# Patient Record
Sex: FEMALE | Race: WHITE | NOT HISPANIC OR LATINO | URBAN - METROPOLITAN AREA
[De-identification: names, ages, dates, MRNs, and addresses within clinical notes are randomized per-mention and may not be internally consistent; named-entity substitution may affect disease eponyms.]

---

## 2018-01-25 VITALS
OXYGEN SATURATION: 100 % | DIASTOLIC BLOOD PRESSURE: 78 MMHG | HEIGHT: 62 IN | WEIGHT: 166.89 LBS | SYSTOLIC BLOOD PRESSURE: 141 MMHG | HEART RATE: 80 BPM | TEMPERATURE: 98 F

## 2018-01-25 RX ORDER — CHLORHEXIDINE GLUCONATE 213 G/1000ML
1 SOLUTION TOPICAL ONCE
Qty: 0 | Refills: 0 | Status: CANCELLED | OUTPATIENT
Start: 2018-01-29 | End: 2018-01-27

## 2018-01-25 NOTE — H&P ADULT - HISTORY OF PRESENT ILLNESS
67 y.o Female with PMHx of  HTN, COPD (denies home O2), Anemia,  who presented to her cardiologist c/o exertional CP and SOB over the past few months.  Pt states she has been experiencing  left sided  CP with accompanying CARCAMO upon ambulation of more than two city blocks     Denies CP,  SOB, palpitations, dizziness, syncope, recent PND/orthopnea, LE edema, or decrease in exercise tolerance.  Echo performed on 01/24/18 revealed normal wall motion, trace to mild MR, LVEF of 60%.  Stress Echo done 01/25/48 revealed inferior, inferior septal and apical wall hypokinesis.     In light of pt's risk factors, Known CAD, above CCS Anginal Class 3 Symptoms, and an abnormal Stress Echo, pt is now referred to [facility] for recommended Cardiac Cath with possible  intervention if clinically indicated to r/o suspected underlying CAD. ***NEEDS TO BE PRE-MEDICATED UPON ARRIVAL     67 y.o Female formers smoker with SEV[employer] PMHx of COPD (denies home O2), Anemia, Lung Nodules under surveillance Q 6months,  who presented to her cardiologist c/o progressively worsening CARCAMO with increasing fatigue over the past few months.  Pt states she can barely walk more than one city block before finding the need to stop and "catch" her breath.  She states for some time she has attributed    these symptoms to her underlying COPD till she recently saw her cardiologist.  She denies experiencing any CP,  palpitations, dizziness, syncope, recent PND/orthopnea, or LE edema.  Echo performed on 01/24/18 revealed normal wall motion, trace to mild MR, LVEF of 60%.  Stress Echo done 01/25/48 revealed inferior, inferior septal and apical wall hypokinesis.     In light of pt's risk factors, Known CAD, above CCS Anginal Class 3 Equivalent Symptoms, and an abnormal Stress Echo, pt is now referred to [facility] for recommended Cardiac Cath with possible  intervention if clinically indicated to r/o suspected underlying CAD. ***NEEDS TO BE PRE-MEDICATED UPON ARRIVAL     67 y.o Female formers smoker with SEVERE [employer]LL[employer] PMHx of COPD (denies home O2), Anemia, Lung Nodules under surveillance Q 6months,  who presented to her cardiologist c/o progressively worsening CARCAMO with increasing fatigue over the past few months.  Pt states she can barely walk more than one city block before finding the need to stop and "catch her breath".  She states for some time she has attributed these symptoms to her underlying COPD till she recently saw her cardiologist who recommended ruling out a cardiac etiology. She denies experiencing any CP,  palpitations, dizziness, syncope, recent PND/orthopnea, or LE edema.  Echo performed on 01/24/18 revealed normal wall motion, trace to mild MR, LVEF of 60%.  Stress Echo done 01/25/48 revealed inferior, inferior septal and apical wall hypokinesis.     In light of pt's risk factors, Known CAD, above CCS Anginal Class 3 Equivalent Symptoms, and an abnormal Stress Echo, pt is now referred to [facility] for recommended Cardiac Cath with possible  intervention if clinically indicated to r/o suspected underlying CAD. 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  Pt states she can barely walk more than one city block before finding the need to stop and "catch her breath".  She states for some time she has attributed these symptoms to her underlying COPD till she recently saw her cardiologist who recommended ruling out a cardiac etiology. She denies experiencing any CP,  recent PND/orthopnea, LE edema, palpitations, dizziness, or syncope.  Echo performed on 01/24/18 revealed normal wall motion, trace to mild MR, LVEF of 60%.  Stress Echo done 01/25/48 revealed inferior, inferior septal and apical wall hypokinesis.     In light of pt's risk factors, Known CAD, above CCS Anginal Class 3 Equivalent Symptoms, and an abnormal Stress Echo, pt is now referred to [facility] for recommended Cardiac Cath with possible  intervention if clinically indicated to r/o suspected underlying CAD. 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  Pt states she can barely walk more than one city block before finding the need to stop and "catch her breath".  She states for some time she has attributed these symptoms to her underlying COPD till she recently saw her cardiologist who recommended ruling out a cardiac etiology. She denies experiencing any CP,  recent PND/orthopnea, LE edema, palpitations, dizziness, or syncope.  Echo performed on 01/24/18 revealed normal wall motion, trace to mild MR, LVEF of 60%.  Stress Echo done 01/25/18 revealed inferior, inferior septal and apical wall hypokinesis.     In light of pt's risk factors, Known CAD, above CCS Anginal Class 3 Equivalent Symptoms, and an abnormal Stress Echo, pt is now referred to [facility] for recommended Cardiac Cath with possible  intervention if clinically indicated to r/o suspected underlying CAD. 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  Pt states she can barely walk more than one city block before finding the need to stop and "catch her breath".  She states for some time she has attributed these symptoms to her underlying COPD till she recently saw her cardiologist who recommended ruling out a cardiac etiology. She denies experiencing any CP,  recent PND/orthopnea, LE edema, palpitations, dizziness, or syncope.  Echo performed on 01/24/18 revealed normal wall motion, trace to mild MR, LVEF of 60%.  Stress Echo done 01/25/18 revealed inferior, inferior septal and apical wall hypokinesis.     In light of pt's risk factors, Known CAD, above CCS Anginal Class 3 Equivalent Symptoms, and an abnormal Stress Echo, pt is now referred to [facility] for recommended Cardiac Cath with possible  intervention if clinically indicated to r/o suspected underlying CAD.

## 2018-01-25 NOTE — H&P ADULT - ASSESSMENT
67 y.o Female former smoker with SEVERE SHELLF[employer] ALLERGY PMHx of COPD (denies home O2), Anemia, Lung Nodules under surveillance Q 6months,  who presents for recommended Cardiac Cath with possible intervention if clinically indicated secondarty to CCS Anginal Class 3 Equivalent symptoms in the setting of an abnormal Stress Echo.          Precath/Consented   Severe Shellfish Allergy-premedicated with Solu-cortef 200mg IVP X 1 STAT  and Benadryl 50mg IVP X 1 on call to the cath lab  Recently started on ASA/Brilinta 60mg PO BID, reloaded upon arrival with Brilinta 180mg PO X  1  IVF hydration NS @ 75cc/hr 67 y.o Female former smoker with SEVERE SHELLF[employer]ERGY PMHx of COPD (denies home O2), Anemia, Lung Nodules under surveillance Q 6months,  who presents for recommended Cardiac Cath with possible intervention if clinically indicated secondarty to CCS Anginal Class 3 Equivalent symptoms in the setting of an abnormal Stress Echo.          -Precath/Consented   -Severe Shellfish Allergy-premedicated with Solu-cortef 200mg IVP X 1 STAT  and Benadryl 50mg IVP X 1 on call to the cath lab  -Recently started on ASA/Brilinta 60mg PO BID, reloaded upon arrival with Brilinta 180mg PO X  1 and ASA 325mg PO x 1   -IVF hydration NS @ 75cc/hr   -WBC elevated 13.4, most likely 2/2 daily  Symbicort use

## 2018-01-25 NOTE — H&P ADULT - FAMILY HISTORY
Father  Still living? No  CVA (cerebral vascular accident), Age at diagnosis: Age Unknown     Sibling  Still living? Unknown  Family history of coronary artery disease in brother, Age at diagnosis: Age Unknown

## 2018-01-25 NOTE — H&P ADULT - PMH
Anemia    COPD (chronic obstructive pulmonary disease) Anemia    COPD (chronic obstructive pulmonary disease)    Lung nodules Anemia    COPD (chronic obstructive pulmonary disease)    H/O: pneumothorax    Lung nodules

## 2018-01-26 ENCOUNTER — INPATIENT (INPATIENT)
Facility: HOSPITAL | Age: 68
LOS: 0 days | Discharge: HOME | DRG: 247 | End: 2018-01-27
Attending: INTERNAL MEDICINE | Admitting: INTERNAL MEDICINE
Payer: COMMERCIAL

## 2018-01-26 DIAGNOSIS — Z90.11 ACQUIRED ABSENCE OF RIGHT BREAST AND NIPPLE: Chronic | ICD-10-CM

## 2018-01-26 LAB
ALBUMIN SERPL ELPH-MCNC: 3.9 G/DL — SIGNIFICANT CHANGE UP (ref 3.3–5)
ALP SERPL-CCNC: 69 U/L — SIGNIFICANT CHANGE UP (ref 40–120)
ALT FLD-CCNC: 22 U/L — SIGNIFICANT CHANGE UP (ref 10–45)
ANION GAP SERPL CALC-SCNC: 13 MMOL/L — SIGNIFICANT CHANGE UP (ref 5–17)
APTT BLD: 29 SEC — SIGNIFICANT CHANGE UP (ref 27.5–37.4)
AST SERPL-CCNC: 19 U/L — SIGNIFICANT CHANGE UP (ref 10–40)
BASOPHILS NFR BLD AUTO: 0.3 % — SIGNIFICANT CHANGE UP (ref 0–2)
BILIRUB SERPL-MCNC: 0.4 MG/DL — SIGNIFICANT CHANGE UP (ref 0.2–1.2)
BUN SERPL-MCNC: 11 MG/DL — SIGNIFICANT CHANGE UP (ref 7–23)
CALCIUM SERPL-MCNC: 9.5 MG/DL — SIGNIFICANT CHANGE UP (ref 8.4–10.5)
CHLORIDE SERPL-SCNC: 100 MMOL/L — SIGNIFICANT CHANGE UP (ref 96–108)
CHOLEST SERPL-MCNC: 185 MG/DL — SIGNIFICANT CHANGE UP (ref 10–199)
CK MB CFR SERPL CALC: 3.1 NG/ML — SIGNIFICANT CHANGE UP (ref 0–6.7)
CO2 SERPL-SCNC: 24 MMOL/L — SIGNIFICANT CHANGE UP (ref 22–31)
CREAT SERPL-MCNC: 0.58 MG/DL — SIGNIFICANT CHANGE UP (ref 0.5–1.3)
CRP SERPL-MCNC: 0.99 MG/DL — HIGH (ref 0–0.4)
EOSINOPHIL NFR BLD AUTO: 1.6 % — SIGNIFICANT CHANGE UP (ref 0–6)
GLUCOSE SERPL-MCNC: 128 MG/DL — HIGH (ref 70–99)
HBA1C BLD-MCNC: 5.4 % — SIGNIFICANT CHANGE UP (ref 4–5.6)
HCT VFR BLD CALC: 36.7 % — SIGNIFICANT CHANGE UP (ref 34.5–45)
HDLC SERPL-MCNC: 55 MG/DL — SIGNIFICANT CHANGE UP (ref 40–125)
HGB BLD-MCNC: 11.8 G/DL — SIGNIFICANT CHANGE UP (ref 11.5–15.5)
INR BLD: 1.04 — SIGNIFICANT CHANGE UP (ref 0.88–1.16)
LIPID PNL WITH DIRECT LDL SERPL: 113 MG/DL — SIGNIFICANT CHANGE UP
LYMPHOCYTES # BLD AUTO: 17.9 % — SIGNIFICANT CHANGE UP (ref 13–44)
MCHC RBC-ENTMCNC: 28.6 PG — SIGNIFICANT CHANGE UP (ref 27–34)
MCHC RBC-ENTMCNC: 32.2 G/DL — SIGNIFICANT CHANGE UP (ref 32–36)
MCV RBC AUTO: 89.1 FL — SIGNIFICANT CHANGE UP (ref 80–100)
MONOCYTES NFR BLD AUTO: 10.3 % — SIGNIFICANT CHANGE UP (ref 2–14)
NEUTROPHILS NFR BLD AUTO: 69.9 % — SIGNIFICANT CHANGE UP (ref 43–77)
PLATELET # BLD AUTO: 286 K/UL — SIGNIFICANT CHANGE UP (ref 150–400)
POTASSIUM SERPL-MCNC: 3.9 MMOL/L — SIGNIFICANT CHANGE UP (ref 3.5–5.3)
POTASSIUM SERPL-SCNC: 3.9 MMOL/L — SIGNIFICANT CHANGE UP (ref 3.5–5.3)
PROT SERPL-MCNC: 7.7 G/DL — SIGNIFICANT CHANGE UP (ref 6–8.3)
PROTHROM AB SERPL-ACNC: 11.5 SEC — SIGNIFICANT CHANGE UP (ref 9.8–12.7)
RBC # BLD: 4.12 M/UL — SIGNIFICANT CHANGE UP (ref 3.8–5.2)
RBC # FLD: 14 % — SIGNIFICANT CHANGE UP (ref 10.3–16.9)
SODIUM SERPL-SCNC: 137 MMOL/L — SIGNIFICANT CHANGE UP (ref 135–145)
TOTAL CHOLESTEROL/HDL RATIO MEASUREMENT: 3.4 RATIO — SIGNIFICANT CHANGE UP (ref 3.3–7.1)
TRIGL SERPL-MCNC: 83 MG/DL — SIGNIFICANT CHANGE UP (ref 10–149)
WBC # BLD: 13.4 K/UL — HIGH (ref 3.8–10.5)
WBC # FLD AUTO: 13.4 K/UL — HIGH (ref 3.8–10.5)

## 2018-01-26 PROCEDURE — 93458 L HRT ARTERY/VENTRICLE ANGIO: CPT | Mod: 26,XU

## 2018-01-26 PROCEDURE — 93572 IV DOP VEL&/PRESS C FLO EA: CPT | Mod: 26,LC

## 2018-01-26 PROCEDURE — 93571 IV DOP VEL&/PRESS C FLO 1ST: CPT | Mod: 26,LD

## 2018-01-26 PROCEDURE — 92928 PRQ TCAT PLMT NTRAC ST 1 LES: CPT | Mod: RC

## 2018-01-26 PROCEDURE — 93010 ELECTROCARDIOGRAM REPORT: CPT

## 2018-01-26 RX ORDER — ASPIRIN/CALCIUM CARB/MAGNESIUM 324 MG
81 TABLET ORAL DAILY
Qty: 0 | Refills: 0 | Status: DISCONTINUED | OUTPATIENT
Start: 2018-01-26 | End: 2018-01-27

## 2018-01-26 RX ORDER — TICAGRELOR 90 MG/1
0 TABLET ORAL
Qty: 0 | Refills: 0 | COMMUNITY

## 2018-01-26 RX ORDER — ASPIRIN/CALCIUM CARB/MAGNESIUM 324 MG
325 TABLET ORAL ONCE
Qty: 0 | Refills: 0 | Status: COMPLETED | OUTPATIENT
Start: 2018-01-26 | End: 2018-01-26

## 2018-01-26 RX ORDER — METOPROLOL TARTRATE 50 MG
100 TABLET ORAL DAILY
Qty: 0 | Refills: 0 | Status: DISCONTINUED | OUTPATIENT
Start: 2018-01-26 | End: 2018-01-27

## 2018-01-26 RX ORDER — SODIUM CHLORIDE 9 MG/ML
500 INJECTION INTRAMUSCULAR; INTRAVENOUS; SUBCUTANEOUS
Qty: 0 | Refills: 0 | Status: DISCONTINUED | OUTPATIENT
Start: 2018-01-26 | End: 2018-01-26

## 2018-01-26 RX ORDER — ATORVASTATIN CALCIUM 80 MG/1
20 TABLET, FILM COATED ORAL DAILY
Qty: 0 | Refills: 0 | Status: DISCONTINUED | OUTPATIENT
Start: 2018-01-26 | End: 2018-01-26

## 2018-01-26 RX ORDER — TICAGRELOR 90 MG/1
180 TABLET ORAL ONCE
Qty: 0 | Refills: 0 | Status: COMPLETED | OUTPATIENT
Start: 2018-01-26 | End: 2018-01-26

## 2018-01-26 RX ORDER — HYDROCORTISONE 20 MG
200 TABLET ORAL ONCE
Qty: 0 | Refills: 0 | Status: COMPLETED | OUTPATIENT
Start: 2018-01-26 | End: 2018-01-26

## 2018-01-26 RX ORDER — SODIUM CHLORIDE 9 MG/ML
500 INJECTION INTRAMUSCULAR; INTRAVENOUS; SUBCUTANEOUS
Qty: 0 | Refills: 0 | Status: DISCONTINUED | OUTPATIENT
Start: 2018-01-26 | End: 2018-01-27

## 2018-01-26 RX ORDER — DIPHENHYDRAMINE HCL 50 MG
50 CAPSULE ORAL ONCE
Qty: 0 | Refills: 0 | Status: COMPLETED | OUTPATIENT
Start: 2018-01-26 | End: 2018-01-26

## 2018-01-26 RX ORDER — TICAGRELOR 90 MG/1
90 TABLET ORAL EVERY 12 HOURS
Qty: 0 | Refills: 0 | Status: DISCONTINUED | OUTPATIENT
Start: 2018-01-26 | End: 2018-01-27

## 2018-01-26 RX ORDER — ATORVASTATIN CALCIUM 80 MG/1
40 TABLET, FILM COATED ORAL AT BEDTIME
Qty: 0 | Refills: 0 | Status: DISCONTINUED | OUTPATIENT
Start: 2018-01-26 | End: 2018-01-27

## 2018-01-26 RX ORDER — BUDESONIDE AND FORMOTEROL FUMARATE DIHYDRATE 160; 4.5 UG/1; UG/1
2 AEROSOL RESPIRATORY (INHALATION)
Qty: 0 | Refills: 0 | Status: DISCONTINUED | OUTPATIENT
Start: 2018-01-26 | End: 2018-01-27

## 2018-01-26 RX ORDER — TIOTROPIUM BROMIDE 18 UG/1
1 CAPSULE ORAL; RESPIRATORY (INHALATION) DAILY
Qty: 0 | Refills: 0 | Status: DISCONTINUED | OUTPATIENT
Start: 2018-01-26 | End: 2018-01-27

## 2018-01-26 RX ORDER — ACETAMINOPHEN 500 MG
325 TABLET ORAL ONCE
Qty: 0 | Refills: 0 | Status: COMPLETED | OUTPATIENT
Start: 2018-01-26 | End: 2018-01-26

## 2018-01-26 RX ORDER — RANOLAZINE 500 MG/1
500 TABLET, FILM COATED, EXTENDED RELEASE ORAL EVERY 12 HOURS
Qty: 0 | Refills: 0 | Status: DISCONTINUED | OUTPATIENT
Start: 2018-01-26 | End: 2018-01-27

## 2018-01-26 RX ADMIN — Medication 50 MILLIGRAM(S): at 09:43

## 2018-01-26 RX ADMIN — Medication 325 MILLIGRAM(S): at 09:41

## 2018-01-26 RX ADMIN — Medication 325 MILLIGRAM(S): at 14:08

## 2018-01-26 RX ADMIN — SODIUM CHLORIDE 75 MILLILITER(S): 9 INJECTION INTRAMUSCULAR; INTRAVENOUS; SUBCUTANEOUS at 09:40

## 2018-01-26 RX ADMIN — BUDESONIDE AND FORMOTEROL FUMARATE DIHYDRATE 2 PUFF(S): 160; 4.5 AEROSOL RESPIRATORY (INHALATION) at 21:17

## 2018-01-26 RX ADMIN — TICAGRELOR 180 MILLIGRAM(S): 90 TABLET ORAL at 09:40

## 2018-01-26 RX ADMIN — ATORVASTATIN CALCIUM 40 MILLIGRAM(S): 80 TABLET, FILM COATED ORAL at 21:17

## 2018-01-26 RX ADMIN — Medication 200 MILLIGRAM(S): at 09:44

## 2018-01-26 RX ADMIN — SODIUM CHLORIDE 75 MILLILITER(S): 9 INJECTION INTRAMUSCULAR; INTRAVENOUS; SUBCUTANEOUS at 14:50

## 2018-01-26 RX ADMIN — TICAGRELOR 90 MILLIGRAM(S): 90 TABLET ORAL at 21:17

## 2018-01-26 RX ADMIN — RANOLAZINE 500 MILLIGRAM(S): 500 TABLET, FILM COATED, EXTENDED RELEASE ORAL at 17:32

## 2018-01-26 NOTE — CONSULT NOTE ADULT - SUBJECTIVE AND OBJECTIVE BOX
CHIEF COMPLAINT: currently denies complaint    HISTORY OF PRESENT ILLNESS:  Mrs. Smith is a 67 year-old female, former smoker,  PMHx COPD, Anemia, Lung Nodules under surveillance Q 6months, HCV s/p Harvoni treatment 2012 who presented to her cardiologist c/o progressively worsening CARCAMO with increasing fatigue over the past few months. Referred to cardiology, TTE 01/24/18 revealed normal wall motion, trace to mild MR, LVEF of 60%.  Stress Echo 01/25/18 revealed inferior septal and apical wall hypokinesis. Now s/p cardiac cath 1/26/18 with FARHANA x 1 RPL, LCX FFR 0.94, mLAD FFR 0.92. Now consulted for prevention.     PMD: Dr. César Dominguez    PAST MEDICAL & SURGICAL HISTORY:  pneumothorax  Lung nodules  COPD (chronic obstructive pulmonary disease)  Anemia  History of lumpectomy of right breast: Benign  HCV s/p Harvoni 2012    FAMILY HISTORY:   Family history of coronary artery disease in brother (Sibling)  CVA (cerebral vascular accident) (Father)  No pertinent family history in first degree relatives    ALLERGIES:   eggs (Rash)  shellfish (Rash; Short breath)  sulfa drugs (Short breath; Rash)    HOME MEDICATIONS:  Spiriva  Symbicort  Claritin-D    INPATIENT MEDS:  aspirin enteric coated 81 milliGRAM(s) Oral daily  atorvastatin 40 milliGRAM(s) Oral at bedtime  buDESOnide 160 MICROgram(s)/formoterol 4.5 MICROgram(s) Inhaler 2 Puff(s) Inhalation two times a day  metoprolol succinate  milliGRAM(s) Oral daily  ranolazine 500 milliGRAM(s) Oral every 12 hours  sodium chloride 0.9%. 500 milliLiter(s) IV Continuous <Continuous>  ticagrelor 90 milliGRAM(s) Oral every 12 hours  tiotropium 18 MICROgram(s) Capsule 1 Capsule(s) Inhalation daily  	  PHYSICAL EXAM:  HR: 87 (01-26-18 @ 15:08) (87 - 87)  BP: 112/61 (01-26-18 @ 15:08) (112/61 - 112/61)  RR: 16 (01-26-18 @ 15:08) (16 - 16)  SpO2: 92% (01-26-18 @ 15:08) (92% - 92%)  Height (cm): 157.48 (01-26 @ 09:26)  Weight (kg): 75.7 (01-26 @ 09:26)  BMI (kg/m2): 30.5 (01-26 @ 09:26)  	  LABS:	                   11.8   13.4  )-----------( 286      ( 26 Jan 2018 09:05 )             36.7     01-26    137  |  100  |  11  ----------------------------<  128<H>  3.9   |  24  |  0.58    Ca    9.5      26 Jan 2018 09:05    TPro  7.7  /  Alb  3.9  /  TBili  0.4  /  DBili  x   /  AST  19  /  ALT  22  /  AlkPhos  69  01-26    Hemoglobin A1C, Whole Blood: 5.4 % (01-26 @ 09:05)    Cholesterol, Serum: 185 mg/dL (01-26 @ 09:05)  HDL Cholesterol, Serum: 55 mg/dL (01-26 @ 09:05)  Triglycerides, Serum: 83 mg/dL (01-26 @ 09:05)  Direct LDL: 113 mg/dL (01-26 @ 09:05)    C-Reactive Protein, Serum: 0.99 mg/dL (01-26 @ 09:05)    Hemoglobin A1C, Whole Blood: 5.4 % (01-26-18 @ 09:05)    10 "S" ASSESSMENT PLAN: SMOKING, SITTING, SUGAR, SALT, SOME FATS, SOCIAL, SLEEP, SIGNS, AND MEDS:  Tobacco usage: former ~45 years, quit 2016, 2PPD-->1PPD-->7 cigs/day then stopped after lung nodules found.   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   Hormone Replacement: denies  Sleep Disorder: reports 6-8 hours of sleep, no previous sleep study, denies snoring  Inflammatory Condition: s/p HCV 2012, denies other   Activity Level: active around her home, but denies receiving 30-45 minutes moderate exercise most days.   Heart Failure: denies LE edema, PND, orthopnea, abdominal distention, cough  Myopathy with Statins: no previous statin use.  GI/ Issues: denies N/V/C/D  Current Diet:  Br: rice chex/frosted flakes with 1 sugar packet and 2% milk.  Lunch: english muffin or Italian bread with turkey; sometimes 1 scrambled egg  Dinner: red meat ~3 times/week, chicken ~3 times/week, all roasted or baked, usually has " a starch, a veggie, and a meat", occasional hot dog, cheeseburgers. Cooks with butter and EVOO.   S/D: [place] bars, cookies, rice pudding, Napoleans    ASSESSMENT/RECOMMENDATIONS: 	  Summary:   67 year-old female, former smoker,  PMHx COPD, Anemia, Lung Nodules under surveillance Q 6months, HCV s/p Harvoni treatment 2012 who presented to her cardiologist c/o progressively worsening CARCAMO with increasing fatigue over the past few months now s/p cardiac cath 1/26/18 with FARHANA x 1 RPL, LCX FFR 0.94, mLAD FFR 0.92. Consulted for prevention.     RECOMMENDATIONS:   Anti-platelet Therapy: DAPT per interventionalist recommendation.   Lipid Therapy: Recommend high dose statin, serial LFT's given HCV hx  Beta Blocker Therapy: Continue current therapy per your discretion.   ACE/ARB Therapy: Continue current therapy per your discretion.   Diet: Low-sodium, low-carbohydrate, Mediterranean diet. Written instruction on the Mediterranean diet was provided. Discussed ways to modify her current diet to incorporate more whole grains and lean meats as well as reducing refined sugars which may lead to DM.   Exercise: 30-45 minutes most days of the week once cleared to do so by their referring cardiologist. Recommend cardiac rehabilitation when appropriate.   Smoking: This patient is a non-smoker.   Stress Management: Instruction on mindfulness meditation was provided.   Sleep: Target 6-8 hours per night  Other: Patient is motivated and is setting a goal for 10 pound weight loss by the next few months with lifestyle and diet modifications. Reiterated the importance of exercise to diminish the central abdominal fat that contributes to heart disease.     Thank you for the opportunity to see this patient. Please feel free to contact [facility] if there are any questions, or if you feel that your patient would benefit from continued follow-up visits with the Program.

## 2018-01-27 VITALS — TEMPERATURE: 97 F

## 2018-01-27 LAB
ANION GAP SERPL CALC-SCNC: 12 MMOL/L — SIGNIFICANT CHANGE UP (ref 5–17)
BUN SERPL-MCNC: 12 MG/DL — SIGNIFICANT CHANGE UP (ref 7–23)
CALCIUM SERPL-MCNC: 8.8 MG/DL — SIGNIFICANT CHANGE UP (ref 8.4–10.5)
CHLORIDE SERPL-SCNC: 102 MMOL/L — SIGNIFICANT CHANGE UP (ref 96–108)
CO2 SERPL-SCNC: 25 MMOL/L — SIGNIFICANT CHANGE UP (ref 22–31)
CREAT SERPL-MCNC: 0.68 MG/DL — SIGNIFICANT CHANGE UP (ref 0.5–1.3)
GLUCOSE SERPL-MCNC: 104 MG/DL — HIGH (ref 70–99)
HCT VFR BLD CALC: 35.2 % — SIGNIFICANT CHANGE UP (ref 34.5–45)
HGB BLD-MCNC: 11.3 G/DL — LOW (ref 11.5–15.5)
MAGNESIUM SERPL-MCNC: 2.1 MG/DL — SIGNIFICANT CHANGE UP (ref 1.6–2.6)
MCHC RBC-ENTMCNC: 28.6 PG — SIGNIFICANT CHANGE UP (ref 27–34)
MCHC RBC-ENTMCNC: 32.1 G/DL — SIGNIFICANT CHANGE UP (ref 32–36)
MCV RBC AUTO: 89.1 FL — SIGNIFICANT CHANGE UP (ref 80–100)
PLATELET # BLD AUTO: 285 K/UL — SIGNIFICANT CHANGE UP (ref 150–400)
POTASSIUM SERPL-MCNC: 3.2 MMOL/L — LOW (ref 3.5–5.3)
POTASSIUM SERPL-SCNC: 3.2 MMOL/L — LOW (ref 3.5–5.3)
RBC # BLD: 3.95 M/UL — SIGNIFICANT CHANGE UP (ref 3.8–5.2)
RBC # FLD: 14 % — SIGNIFICANT CHANGE UP (ref 10.3–16.9)
SODIUM SERPL-SCNC: 139 MMOL/L — SIGNIFICANT CHANGE UP (ref 135–145)
WBC # BLD: 13.5 K/UL — HIGH (ref 3.8–10.5)
WBC # FLD AUTO: 13.5 K/UL — HIGH (ref 3.8–10.5)

## 2018-01-27 PROCEDURE — 99239 HOSP IP/OBS DSCHRG MGMT >30: CPT

## 2018-01-27 RX ORDER — RANOLAZINE 500 MG/1
1 TABLET, FILM COATED, EXTENDED RELEASE ORAL
Qty: 0 | Refills: 0 | COMMUNITY

## 2018-01-27 RX ORDER — TICAGRELOR 90 MG/1
1 TABLET ORAL
Qty: 60 | Refills: 11 | OUTPATIENT
Start: 2018-01-27 | End: 2019-01-21

## 2018-01-27 RX ORDER — METOPROLOL TARTRATE 50 MG
1 TABLET ORAL
Qty: 30 | Refills: 2 | OUTPATIENT
Start: 2018-01-27 | End: 2018-04-26

## 2018-01-27 RX ORDER — METOPROLOL TARTRATE 50 MG
1 TABLET ORAL
Qty: 0 | Refills: 0 | COMMUNITY

## 2018-01-27 RX ORDER — ASPIRIN/CALCIUM CARB/MAGNESIUM 324 MG
1 TABLET ORAL
Qty: 30 | Refills: 11 | OUTPATIENT
Start: 2018-01-27 | End: 2019-01-21

## 2018-01-27 RX ORDER — RANOLAZINE 500 MG/1
1 TABLET, FILM COATED, EXTENDED RELEASE ORAL
Qty: 60 | Refills: 2 | OUTPATIENT
Start: 2018-01-27 | End: 2018-04-26

## 2018-01-27 RX ORDER — METOPROLOL TARTRATE 50 MG
4 TABLET ORAL
Qty: 120 | Refills: 2 | OUTPATIENT
Start: 2018-01-27 | End: 2018-04-26

## 2018-01-27 RX ORDER — POTASSIUM CHLORIDE 20 MEQ
40 PACKET (EA) ORAL EVERY 4 HOURS
Qty: 0 | Refills: 0 | Status: COMPLETED | OUTPATIENT
Start: 2018-01-27 | End: 2018-01-27

## 2018-01-27 RX ORDER — ATORVASTATIN CALCIUM 80 MG/1
1 TABLET, FILM COATED ORAL
Qty: 30 | Refills: 2 | OUTPATIENT
Start: 2018-01-27 | End: 2018-04-26

## 2018-01-27 RX ORDER — TICAGRELOR 90 MG/1
1 TABLET ORAL
Qty: 60 | Refills: 0 | OUTPATIENT
Start: 2018-01-27 | End: 2018-02-25

## 2018-01-27 RX ORDER — TICAGRELOR 90 MG/1
1 TABLET ORAL
Qty: 0 | Refills: 0 | COMMUNITY

## 2018-01-27 RX ORDER — ASPIRIN/CALCIUM CARB/MAGNESIUM 324 MG
1 TABLET ORAL
Qty: 0 | Refills: 0 | COMMUNITY

## 2018-01-27 RX ADMIN — Medication 40 MILLIEQUIVALENT(S): at 12:24

## 2018-01-27 RX ADMIN — RANOLAZINE 500 MILLIGRAM(S): 500 TABLET, FILM COATED, EXTENDED RELEASE ORAL at 06:46

## 2018-01-27 RX ADMIN — Medication 81 MILLIGRAM(S): at 12:24

## 2018-01-27 RX ADMIN — Medication 100 MILLIGRAM(S): at 06:46

## 2018-01-27 RX ADMIN — BUDESONIDE AND FORMOTEROL FUMARATE DIHYDRATE 2 PUFF(S): 160; 4.5 AEROSOL RESPIRATORY (INHALATION) at 08:27

## 2018-01-27 RX ADMIN — Medication 40 MILLIEQUIVALENT(S): at 10:13

## 2018-01-27 RX ADMIN — TICAGRELOR 90 MILLIGRAM(S): 90 TABLET ORAL at 10:13

## 2018-01-27 NOTE — DISCHARGE NOTE ADULT - PATIENT PORTAL LINK FT
“You can access the [facility] Patient Portal, offered by [facility], by registering with the following website: [email or web address]”

## 2018-01-27 NOTE — DISCHARGE NOTE ADULT - CARE PLAN
Principal Discharge DX:	Coronary artery disease  Goal:	Maintain Low Fat, Low Cholesterol, and Low Salt Diet  Assessment and plan of treatment:	You had a cardiac angiogram with stent placement to one of your heart arteries (Right Coronary Artery). Continue Aspirin and Brilinta. DO NOT STOP TAKING THESE MEDICATIONS UNLESS INSTRUCTED BY YOUR CARDIOLOGIST AS YOUR STENT CAN CLOSE RESULTING IN HEART ATTACK. You were started on a cholesterol medication (Lipitor). Have Liver Function and Cholesterol levels Checked in 1 month  Secondary Diagnosis:	COPD (chronic obstructive pulmonary disease)  Assessment and plan of treatment:	Continue Inhalers Principal Discharge DX:	Coronary artery disease  Goal:	Maintain Low Fat, Low Cholesterol, and Low Salt Diet  Assessment and plan of treatment:	You had a cardiac angiogram with stent placement to one of your heart arteries (Right Coronary Artery). Continue Aspirin and Brilinta. DO NOT STOP TAKING THESE MEDICATIONS UNLESS INSTRUCTED BY YOUR CARDIOLOGIST AS YOUR STENT CAN CLOSE RESULTING IN HEART ATTACK. You were started on a cholesterol medication (Lipitor). Have Liver Function and Cholesterol levels Checked in 1 month. Continue Ranexa  Secondary Diagnosis:	COPD (chronic obstructive pulmonary disease)  Assessment and plan of treatment:	Continue Inhalers Principal Discharge DX:	Coronary artery disease  Goal:	Maintain Low Fat, Low Cholesterol, and Low Salt Diet  Assessment and plan of treatment:	You had a cardiac angiogram with stent placement to one of your heart arteries (Right Coronary Artery). Continue Aspirin and Brilinta at increased dose of 90 mg twice daily. DO NOT STOP TAKING THESE MEDICATIONS UNLESS INSTRUCTED BY YOUR CARDIOLOGIST AS YOUR STENT CAN CLOSE RESULTING IN HEART ATTACK. You were started on a cholesterol medication (Lipitor). Have Liver Function and Cholesterol levels Checked in 1 month. Continue Ranexa  Secondary Diagnosis:	COPD (chronic obstructive pulmonary disease)  Assessment and plan of treatment:	Continue Inhalers Principal Discharge DX:	Coronary artery disease  Goal:	Maintain Low Fat, Low Cholesterol, and Low Salt Diet  Assessment and plan of treatment:	You had a cardiac angiogram with stent placement to one of your heart arteries (Right Coronary Artery). Continue Aspirin and Brilinta at increased dose of 90 mg twice daily. DO NOT STOP TAKING THESE MEDICATIONS UNLESS INSTRUCTED BY YOUR CARDIOLOGIST AS YOUR STENT CAN CLOSE RESULTING IN HEART ATTACK. You were started on a cholesterol medication (Lipitor). Your Total Cholesterol is 185 (Goal <200). Your LDL (bad cholesterol) is 113- Goal  <70. Have Liver Function and Cholesterol levels Checked in 1 month. Continue Ranexa. Continue Toprol XL  Secondary Diagnosis:	COPD (chronic obstructive pulmonary disease)  Assessment and plan of treatment:	Continue Inhalers

## 2018-01-27 NOTE — DISCHARGE NOTE ADULT - PLAN OF CARE
Maintain Low Fat, Low Cholesterol, and Low Salt Diet You had a cardiac angiogram with stent placement to one of your heart arteries (Right Coronary Artery). Continue Aspirin and Brilinta. DO NOT STOP TAKING THESE MEDICATIONS UNLESS INSTRUCTED BY YOUR CARDIOLOGIST AS YOUR STENT CAN CLOSE RESULTING IN HEART ATTACK. You were started on a cholesterol medication (Lipitor). Have Liver Function and Cholesterol levels Checked in 1 month Continue Inhalers You had a cardiac angiogram with stent placement to one of your heart arteries (Right Coronary Artery). Continue Aspirin and Brilinta. DO NOT STOP TAKING THESE MEDICATIONS UNLESS INSTRUCTED BY YOUR CARDIOLOGIST AS YOUR STENT CAN CLOSE RESULTING IN HEART ATTACK. You were started on a cholesterol medication (Lipitor). Have Liver Function and Cholesterol levels Checked in 1 month. Continue Ranexa You had a cardiac angiogram with stent placement to one of your heart arteries (Right Coronary Artery). Continue Aspirin and Brilinta at increased dose of 90 mg twice daily. DO NOT STOP TAKING THESE MEDICATIONS UNLESS INSTRUCTED BY YOUR CARDIOLOGIST AS YOUR STENT CAN CLOSE RESULTING IN HEART ATTACK. You were started on a cholesterol medication (Lipitor). Have Liver Function and Cholesterol levels Checked in 1 month. Continue Ranexa You had a cardiac angiogram with stent placement to one of your heart arteries (Right Coronary Artery). Continue Aspirin and Brilinta at increased dose of 90 mg twice daily. DO NOT STOP TAKING THESE MEDICATIONS UNLESS INSTRUCTED BY YOUR CARDIOLOGIST AS YOUR STENT CAN CLOSE RESULTING IN HEART ATTACK. You were started on a cholesterol medication (Lipitor). Your Total Cholesterol is 185 (Goal <200). Your LDL (bad cholesterol) is 113- Goal  <70. Have Liver Function and Cholesterol levels Checked in 1 month. Continue Ranexa. Continue Toprol XL

## 2018-01-27 NOTE — DISCHARGE NOTE ADULT - MEDICATION SUMMARY - MEDICATIONS TO TAKE
I will START or STAY ON the medications listed below when I get home from the hospital:    Ecotrin Adult Low Strength 81 mg oral delayed release tablet  -- 1 tab(s) by mouth once a day  -- Indication: For Coronary artery disease (Heart), Stent    Ranexa 500 mg oral tablet, extended release  -- 1 tab(s) by mouth 2 times a day  -- Indication: For Coronary artery disease (Heart),     Claritin 24 Hour Allergy 10 mg oral tablet  -- 1 tab(s) by mouth every 12 hours, As Needed  -- Indication: For Allergies    atorvastatin 40 mg oral tablet  -- 1 tab(s) by mouth once a day (at bedtime)  -- Indication: For Hyperlipidemia (Cholesterol), Coronary artery disease (Heart),     ticagrelor 90 mg oral tablet  -- 1 tab(s) by mouth every 12 hours  -- Indication: For Coronary artery disease (Heart), Stent    Toprol- mg oral tablet, extended release  -- 1 tab(s) by mouth once a day   -- Indication: For Coronary artery disease (Heart),    Symbicort 160 mcg-4.5 mcg/inh inhalation aerosol  -- 2 puff(s) inhaled 2 times a day  -- Indication: For COPD (chronic obstructive pulmonary disease)    Spiriva 18 mcg inhalation capsule  -- 1 cap(s) inhaled once a day  -- Indication: For COPD (chronic obstructive pulmonary disease) I will START or STAY ON the medications listed below when I get home from the hospital:    Ecotrin Adult Low Strength 81 mg oral delayed release tablet  -- 1 tab(s) by mouth once a day  -- Indication: For Coronary artery disease (Heart), Stent    Ranexa 500 mg oral tablet, extended release  -- 1 tab(s) by mouth 2 times a day  -- Indication: For Coronary artery disease (Heart),     Claritin 24 Hour Allergy 10 mg oral tablet  -- 1 tab(s) by mouth every 12 hours, As Needed  -- Indication: For Allergies    atorvastatin 40 mg oral tablet  -- 1 tab(s) by mouth once a day (at bedtime)  -- Indication: For Hyperlipidemia (Cholesterol), Coronary artery disease (Heart),     ticagrelor 90 mg oral tablet  -- 1 tab(s) by mouth every 12 hours  -- Indication: For Coronary artery disease (Heart), Stent    Brilinta (ticagrelor) 90 mg oral tablet  -- 1 tab(s) by mouth 2 times a day   -- It is very important that you take or use this exactly as directed.  Do not skip doses or discontinue unless directed by your doctor.  Obtain medical advice before taking any non-prescription drugs as some may affect the action of this medication.    -- Indication: For Coronary artery disease (Heart), Stent    Brilinta (ticagrelor) 90 mg oral tablet  -- 1 tab(s) by mouth 2 times a day   -- It is very important that you take or use this exactly as directed.  Do not skip doses or discontinue unless directed by your doctor.  Obtain medical advice before taking any non-prescription drugs as some may affect the action of this medication.    -- Indication: For Coronary artery disease (Heart), Stent    Toprol- mg oral tablet, extended release  -- 1 tab(s) by mouth once a day   -- Indication: For Coronary artery disease (Heart),    Symbicort 160 mcg-4.5 mcg/inh inhalation aerosol  -- 2 puff(s) inhaled 2 times a day  -- Indication: For COPD (chronic obstructive pulmonary disease)    Spiriva 18 mcg inhalation capsule  -- 1 cap(s) inhaled once a day  -- Indication: For COPD (chronic obstructive pulmonary disease)

## 2018-01-27 NOTE — DISCHARGE NOTE ADULT - HOSPITAL COURSE
67 y.o Female former smoker with SEVERE SHELLFISH ALLERGY PMHx of COPD (denies home O2), Anemia, Lung Nodules under surveillance Q 6months,  who presented to her cardiologist c/o progressively worsening CARCAMO with increasing fatigue over the past few months.  Pt states she can barely walk more than one city block before finding the need to stop and "catch her breath".  She states for some time she has attributed these symptoms to her underlying COPD till she recently saw her cardiologist who recommended ruling out a cardiac etiology. She denies experiencing any CP,  recent PND/orthopnea, LE edema, palpitations, dizziness, or syncope.  Echo performed on 01/24/18 revealed normal wall motion, trace to mild MR, LVEF of 60%.  Stress Echo done 01/25/18 revealed inferior, inferior septal and apical wall hypokinesis. Patient presented for cardiac cath due to CCS Angina Class III Symptoms and abnormal stress test. Patient s/p cardiac catheterization (1/26/2018):  EF 60%, EDP 17 mmHG. LM normal. dLAD 50-60% s/p FFR 0.92. LCx 50-60% s/p FFR 0.94. , dRCA 70% stenosis. Intervention FARHANA to dRCA. Labs and telemetry reviewed. Hypokalemia K+3.2 Kdur 40 mEq PO x 2 doses ordered. Magnesium 2.1 . Patient C/P free and HD stable and cleared for discharge by Dr. Delgado. Prescriptions for ASA, Brilinta, and Lipitor E-Prescribed to Pharmacy 67 y.o Female former smoker with SEVERE SHELLFISH ALLERGY PMHx of COPD (denies home O2), Anemia, Lung Nodules under surveillance Q 6months,  Hepatitis C (treated with Harvoni) who presented to her cardiologist c/o progressively worsening CARCAMO with increasing fatigue over the past few months.  Pt states she can barely walk more than one city block before finding the need to stop and "catch her breath".  She states for some time she has attributed these symptoms to her underlying COPD till she recently saw her cardiologist who recommended ruling out a cardiac etiology. She denies experiencing any CP,  recent PND/orthopnea, LE edema, palpitations, dizziness, or syncope.  Echo performed on 01/24/18 revealed normal wall motion, trace to mild MR, LVEF of 60%.  Stress Echo done 01/25/18 revealed inferior, inferior septal and apical wall hypokinesis. Patient presented for cardiac cath due to CCS Angina Class III Symptoms and abnormal stress test. Patient s/p cardiac catheterization (1/26/2018):  EF 60%, EDP 17 mmHG. LM normal. dLAD 50-60% s/p FFR 0.92. LCx 50-60% s/p FFR 0.94. , dRCA 70% stenosis. Intervention FARHANA to dRCA. Labs and telemetry reviewed. Hypokalemia K+3.2 Kdur 40 mEq PO x 2 doses ordered. Magnesium 2.1 . Patient C/P free and HD stable and cleared for discharge by Dr. Delgado. Prescriptions for ASA, Brilinta, Ranexa, and Lipitor E-Prescribed to Pharmacy. 67 y.o Female former smoker with SEVERE SHELLFISH ALLERGY PMHx of COPD (denies home O2), Anemia, Lung Nodules under surveillance Q 6months,  Hepatitis C (treated with Harvoni) who presented to her cardiologist c/o progressively worsening CARCAMO with increasing fatigue over the past few months.  Pt states she can barely walk more than one city block before finding the need to stop and "catch her breath".  She states for some time she has attributed these symptoms to her underlying COPD till she recently saw her cardiologist who recommended ruling out a cardiac etiology. She denies experiencing any CP,  recent PND/orthopnea, LE edema, palpitations, dizziness, or syncope.  Echo performed on 01/24/18 revealed normal wall motion, trace to mild MR, LVEF of 60%.  Stress Echo done 01/25/18 revealed inferior, inferior septal and apical wall hypokinesis. Patient presented for cardiac cath due to CCS Angina Class III Symptoms and abnormal stress test. Patient s/p cardiac catheterization (1/26/2018):  EF 60%, EDP 17 mmHG. LM normal. dLAD 50-60% s/p FFR 0.92. LCx 50-60% s/p FFR 0.94. , dRCA 70% stenosis. Intervention FARHANA to dRCA. Labs and telemetry reviewed. Hypokalemia K+3.2 Kdur 40 mEq PO x 2 doses ordered. Magnesium 2.1 . Patient C/P free and HD stable and cleared for discharge by Dr. Delgado. Prescriptions for ASA, Brilinta, Ranexa, and Lipitor E-Prescribed to Pharmacy.     V/S:	BP: 118-127/60s		HR: 70s  	RR:  16	Temp:  96. 6  		O2 sat-99% RA   	  GENERAL: Laying in bed  in no acute distress  CVS: + S1 S2. RRR. No murmurs, rubs or gallops.   Pulm: CTA Bilaterally. No rhonchi, wheezes, or rales.  Abd: BS x 4. Soft NT/ND.  Ext: No clubbing, cyanosis, or edema BLE. No calf tenderness BLE.   Scattered varicosities BLE.   Right Groin; Soft. Non-tender. No hematoma, bleed or bruit.  Distal Pulses Intact to Baseline 67 y.o Female former smoker with SEVERE SHELLFISH ALLERGY PMHx of COPD (denies home O2), Anemia, Lung Nodules under surveillance Q 6months,  Hepatitis C (treated with Harvoni) who presented to her cardiologist c/o progressively worsening CARCAMO with increasing fatigue over the past few months.  Pt states she can barely walk more than one city block before finding the need to stop and "catch her breath".  She states for some time she has attributed these symptoms to her underlying COPD till she recently saw her cardiologist who recommended ruling out a cardiac etiology. She denies experiencing any CP,  recent PND/orthopnea, LE edema, palpitations, dizziness, or syncope.  Echo performed on 01/24/18 revealed normal wall motion, trace to mild MR, LVEF of 60%.  Stress Echo done 01/25/18 revealed inferior, inferior septal and apical wall hypokinesis. Patient presented for cardiac cath due to CCS Angina Class III Symptoms and abnormal stress test. Patient s/p cardiac catheterization (1/26/2018):  EF 60%, EDP 17 mmHG. LM normal. dLAD 50-60% s/p FFR 0.92. LCx 50-60% s/p FFR 0.94. , dRCA 70% stenosis. Intervention FARHANA to dRCA. Labs and telemetry reviewed. Hypokalemia K+3.2 Kdur 40 mEq PO x 2 doses ordered. Magnesium 2.1 . Patient C/P free and HD stable and cleared for discharge by Dr. Delgado. Prescriptions for ASA, Brilinta, Ranexa, and Lipitor E-Prescribed to Pharmacy. Pharmacy contacted and Brilinta covered with $80 copay.    V/S:	BP: 118-127/60s		HR: 70s  	RR:  16	Temp:  96. 6  		O2 sat-99% RA   	  GENERAL: Laying in bed  in no acute distress  CVS: + S1 S2. RRR. No murmurs, rubs or gallops.   Pulm: CTA Bilaterally. No rhonchi, wheezes, or rales.  Abd: BS x 4. Soft NT/ND.  Ext: No clubbing, cyanosis, or edema BLE. No calf tenderness BLE.   Scattered varicosities BLE.   Right Groin; Soft. Non-tender. No hematoma, bleed or bruit.  Distal Pulses Intact to Baseline 67 y.o Female former smoker with SEVERE SHELLFISH ALLERGY PMHx of COPD (denies home O2), Anemia, Lung Nodules under surveillance Q 6months,  Hepatitis C (treated with Harvoni) who presented to her cardiologist c/o progressively worsening CARCAMO with increasing fatigue over the past few months.  Pt states she can barely walk more than one city block before finding the need to stop and "catch her breath".  She states for some time she has attributed these symptoms to her underlying COPD till she recently saw her cardiologist who recommended ruling out a cardiac etiology. She denies experiencing any CP,  recent PND/orthopnea, LE edema, palpitations, dizziness, or syncope.  Echo performed on 01/24/18 revealed normal wall motion, trace to mild MR, LVEF of 60%.  Stress Echo done 01/25/18 revealed inferior, inferior septal and apical wall hypokinesis. Patient presented for cardiac cath due to CCS Angina Class III Symptoms and abnormal stress test. Patient s/p cardiac catheterization (1/26/2018):  EF 60%, EDP 17 mmHG. LM normal. dLAD 50-60% s/p FFR 0.92. LCx 50-60% s/p FFR 0.94. , dRCA 70% stenosis. Intervention FARHANA to dRCA. Labs and telemetry reviewed. Hypokalemia K+3.2 Kdur 40 mEq PO x 2 doses ordered. Magnesium 2.1 . Patient C/P free and HD stable and cleared for discharge by Dr. Delgado. Prescriptions for ASA, Brilinta, Ranexa, and Lipitor E-Prescribed to Pharmacy. Pharmacy contacted and Brilinta covered with $80 copay. Prescription for 30 day supply 60 pills E-Prescribed to [facility] who confirmed have it in stock and patient can  post discharge. Patient informed.     V/S:	BP: 118-127/60s		HR: 70s  	RR:  16	Temp:  96. 6  		O2 sat-99% RA   	  GENERAL: Laying in bed  in no acute distress  CVS: + S1 S2. RRR. No murmurs, rubs or gallops.   Pulm: CTA Bilaterally. No rhonchi, wheezes, or rales.  Abd: BS x 4. Soft NT/ND.  Ext: No clubbing, cyanosis, or edema BLE. No calf tenderness BLE.   Scattered varicosities BLE.   Right Groin; Soft. Non-tender. No hematoma, bleed or bruit.  Distal Pulses Intact to Baseline

## 2018-01-27 NOTE — DISCHARGE NOTE ADULT - INSTRUCTIONS
You underwent a coronary angiogram and should wait 3 days before returning to ordinary activities. Do not drive for 2 days. Consult your doctor before returning to vigorous activity. You may return to work in 3-5 days. The catheter from your right groin was removed and you should remove the dressing in 24 hours. You may shower once the dressing is removed, but avoid baths, hot tubs, or swimming for 5 days to prevent infection. If you notice bleeding from the site, hardening and pain at the site, drainage or redness from the site, coolness/paleness of the right leg, weakness/paralysis right leg (unable to lift or move) right leg,  swelling, or fever, please call [phone]. Please continue your Aspirin and Brilinta as prescribed unless otherwise indicated by your cardiologist. All of your prescriptions have been sent to the pharmacy. Please follow up with Dr. Dominguez in 1-2 weeks. All of your needed prescriptions have been sent electronically to your pharmacy.

## 2018-01-27 NOTE — DISCHARGE NOTE ADULT - MEDICATION SUMMARY - MEDICATIONS TO CHANGE
I will SWITCH the dose or number of times a day I take the medications listed below when I get home from the hospital:    Brilinta (ticagrelor) 60 mg oral tablet  -- 1 tab(s) by mouth 2 times a day

## 2018-01-29 PROCEDURE — C1889: CPT

## 2018-01-29 PROCEDURE — 85610 PROTHROMBIN TIME: CPT

## 2018-01-29 PROCEDURE — 85027 COMPLETE CBC AUTOMATED: CPT

## 2018-01-29 PROCEDURE — 93572 IV DOP VEL&/PRESS C FLO EA: CPT

## 2018-01-29 PROCEDURE — 82550 ASSAY OF CK (CPK): CPT

## 2018-01-29 PROCEDURE — 93571 IV DOP VEL&/PRESS C FLO 1ST: CPT

## 2018-01-29 PROCEDURE — 80048 BASIC METABOLIC PNL TOTAL CA: CPT

## 2018-01-29 PROCEDURE — C9600: CPT

## 2018-01-29 PROCEDURE — C1769: CPT

## 2018-01-29 PROCEDURE — 80061 LIPID PANEL: CPT

## 2018-01-29 PROCEDURE — C1894: CPT

## 2018-01-29 PROCEDURE — 85730 THROMBOPLASTIN TIME PARTIAL: CPT

## 2018-01-29 PROCEDURE — C1874: CPT

## 2018-01-29 PROCEDURE — 93005 ELECTROCARDIOGRAM TRACING: CPT

## 2018-01-29 PROCEDURE — 83735 ASSAY OF MAGNESIUM: CPT

## 2018-01-29 PROCEDURE — 85025 COMPLETE CBC W/AUTO DIFF WBC: CPT

## 2018-01-29 PROCEDURE — 83036 HEMOGLOBIN GLYCOSYLATED A1C: CPT

## 2018-01-29 PROCEDURE — 86140 C-REACTIVE PROTEIN: CPT

## 2018-01-29 PROCEDURE — C1760: CPT

## 2018-01-29 PROCEDURE — C1725: CPT

## 2018-01-29 PROCEDURE — 36415 COLL VENOUS BLD VENIPUNCTURE: CPT

## 2018-01-29 PROCEDURE — 93458 L HRT ARTERY/VENTRICLE ANGIO: CPT

## 2018-01-29 PROCEDURE — 82553 CREATINE MB FRACTION: CPT

## 2018-01-29 PROCEDURE — 80053 COMPREHEN METABOLIC PANEL: CPT

## 2018-01-29 PROCEDURE — 94640 AIRWAY INHALATION TREATMENT: CPT

## 2018-01-29 PROCEDURE — C1887: CPT

## 2018-01-31 DIAGNOSIS — Z91.013 ALLERGY TO SEAFOOD: ICD-10-CM

## 2018-01-31 DIAGNOSIS — I25.119 ATHEROSCLEROTIC HEART DISEASE OF NATIVE CORONARY ARTERY WITH UNSPECIFIED ANGINA PECTORIS: ICD-10-CM

## 2018-01-31 DIAGNOSIS — Z87.891 PERSONAL HISTORY OF NICOTINE DEPENDENCE: ICD-10-CM

## 2018-01-31 DIAGNOSIS — E87.6 HYPOKALEMIA: ICD-10-CM

## 2018-01-31 DIAGNOSIS — J44.9 CHRONIC OBSTRUCTIVE PULMONARY DISEASE, UNSPECIFIED: ICD-10-CM

## 2018-01-31 DIAGNOSIS — D64.9 ANEMIA, UNSPECIFIED: ICD-10-CM

## 2018-01-31 DIAGNOSIS — R91.8 OTHER NONSPECIFIC ABNORMAL FINDING OF LUNG FIELD: ICD-10-CM

## 2018-01-31 DIAGNOSIS — Z86.19 PERSONAL HISTORY OF OTHER INFECTIOUS AND PARASITIC DISEASES: ICD-10-CM

## 2018-03-19 ENCOUNTER — INPATIENT (INPATIENT)
Facility: HOSPITAL | Age: 68
LOS: 0 days | Discharge: HOME | DRG: 303 | End: 2018-03-20
Attending: INTERNAL MEDICINE | Admitting: INTERNAL MEDICINE
Payer: COMMERCIAL

## 2018-03-19 VITALS
HEART RATE: 83 BPM | SYSTOLIC BLOOD PRESSURE: 142 MMHG | TEMPERATURE: 98 F | OXYGEN SATURATION: 99 % | DIASTOLIC BLOOD PRESSURE: 93 MMHG | RESPIRATION RATE: 16 BRPM

## 2018-03-19 DIAGNOSIS — Z90.11 ACQUIRED ABSENCE OF RIGHT BREAST AND NIPPLE: Chronic | ICD-10-CM

## 2018-03-19 LAB
ALBUMIN SERPL ELPH-MCNC: 3.7 G/DL — SIGNIFICANT CHANGE UP (ref 3.3–5)
ALP SERPL-CCNC: 79 U/L — SIGNIFICANT CHANGE UP (ref 40–120)
ALT FLD-CCNC: 20 U/L — SIGNIFICANT CHANGE UP (ref 10–45)
ANION GAP SERPL CALC-SCNC: 12 MMOL/L — SIGNIFICANT CHANGE UP (ref 5–17)
APTT BLD: 28.7 SEC — SIGNIFICANT CHANGE UP (ref 27.5–37.4)
AST SERPL-CCNC: 18 U/L — SIGNIFICANT CHANGE UP (ref 10–40)
BASOPHILS NFR BLD AUTO: 0.8 % — SIGNIFICANT CHANGE UP (ref 0–2)
BILIRUB SERPL-MCNC: 0.4 MG/DL — SIGNIFICANT CHANGE UP (ref 0.2–1.2)
BUN SERPL-MCNC: 13 MG/DL — SIGNIFICANT CHANGE UP (ref 7–23)
CALCIUM SERPL-MCNC: 9.7 MG/DL — SIGNIFICANT CHANGE UP (ref 8.4–10.5)
CHLORIDE SERPL-SCNC: 99 MMOL/L — SIGNIFICANT CHANGE UP (ref 96–108)
CK MB CFR SERPL CALC: 5 NG/ML — SIGNIFICANT CHANGE UP (ref 0–6.7)
CO2 SERPL-SCNC: 27 MMOL/L — SIGNIFICANT CHANGE UP (ref 22–31)
CREAT SERPL-MCNC: 0.63 MG/DL — SIGNIFICANT CHANGE UP (ref 0.5–1.3)
EOSINOPHIL NFR BLD AUTO: 7.5 % — HIGH (ref 0–6)
GLUCOSE SERPL-MCNC: 96 MG/DL — SIGNIFICANT CHANGE UP (ref 70–99)
HCT VFR BLD CALC: 35.1 % — SIGNIFICANT CHANGE UP (ref 34.5–45)
HGB BLD-MCNC: 11 G/DL — LOW (ref 11.5–15.5)
INR BLD: 1.07 — SIGNIFICANT CHANGE UP (ref 0.88–1.16)
LYMPHOCYTES # BLD AUTO: 28.9 % — SIGNIFICANT CHANGE UP (ref 13–44)
MCHC RBC-ENTMCNC: 28.4 PG — SIGNIFICANT CHANGE UP (ref 27–34)
MCHC RBC-ENTMCNC: 31.3 G/DL — LOW (ref 32–36)
MCV RBC AUTO: 90.7 FL — SIGNIFICANT CHANGE UP (ref 80–100)
MONOCYTES NFR BLD AUTO: 10 % — SIGNIFICANT CHANGE UP (ref 2–14)
NEUTROPHILS NFR BLD AUTO: 52.8 % — SIGNIFICANT CHANGE UP (ref 43–77)
PLATELET # BLD AUTO: 314 K/UL — SIGNIFICANT CHANGE UP (ref 150–400)
POTASSIUM SERPL-MCNC: 3.8 MMOL/L — SIGNIFICANT CHANGE UP (ref 3.5–5.3)
POTASSIUM SERPL-SCNC: 3.8 MMOL/L — SIGNIFICANT CHANGE UP (ref 3.5–5.3)
PROT SERPL-MCNC: 7.5 G/DL — SIGNIFICANT CHANGE UP (ref 6–8.3)
PROTHROM AB SERPL-ACNC: 11.9 SEC — SIGNIFICANT CHANGE UP (ref 9.8–12.7)
RBC # BLD: 3.87 M/UL — SIGNIFICANT CHANGE UP (ref 3.8–5.2)
RBC # FLD: 14.5 % — SIGNIFICANT CHANGE UP (ref 10.3–16.9)
SODIUM SERPL-SCNC: 138 MMOL/L — SIGNIFICANT CHANGE UP (ref 135–145)
TROPONIN T SERPL-MCNC: <0.01 NG/ML — SIGNIFICANT CHANGE UP (ref 0–0.01)
WBC # BLD: 15.8 K/UL — HIGH (ref 3.8–10.5)
WBC # FLD AUTO: 15.8 K/UL — HIGH (ref 3.8–10.5)

## 2018-03-19 PROCEDURE — 99222 1ST HOSP IP/OBS MODERATE 55: CPT

## 2018-03-19 PROCEDURE — 99285 EMERGENCY DEPT VISIT HI MDM: CPT | Mod: 25

## 2018-03-19 PROCEDURE — 71046 X-RAY EXAM CHEST 2 VIEWS: CPT | Mod: 26

## 2018-03-19 PROCEDURE — 71275 CT ANGIOGRAPHY CHEST: CPT | Mod: 26

## 2018-03-19 PROCEDURE — 93010 ELECTROCARDIOGRAM REPORT: CPT

## 2018-03-19 RX ORDER — BUDESONIDE AND FORMOTEROL FUMARATE DIHYDRATE 160; 4.5 UG/1; UG/1
2 AEROSOL RESPIRATORY (INHALATION)
Qty: 0 | Refills: 0 | Status: DISCONTINUED | OUTPATIENT
Start: 2018-03-19 | End: 2018-03-20

## 2018-03-19 RX ORDER — ASPIRIN/CALCIUM CARB/MAGNESIUM 324 MG
81 TABLET ORAL DAILY
Qty: 0 | Refills: 0 | Status: DISCONTINUED | OUTPATIENT
Start: 2018-03-19 | End: 2018-03-20

## 2018-03-19 RX ORDER — TIOTROPIUM BROMIDE 18 UG/1
18 CAPSULE ORAL; RESPIRATORY (INHALATION) DAILY
Qty: 0 | Refills: 0 | Status: DISCONTINUED | OUTPATIENT
Start: 2018-03-19 | End: 2018-03-20

## 2018-03-19 RX ORDER — ASPIRIN/CALCIUM CARB/MAGNESIUM 324 MG
243 TABLET ORAL ONCE
Qty: 0 | Refills: 0 | Status: COMPLETED | OUTPATIENT
Start: 2018-03-19 | End: 2018-03-19

## 2018-03-19 RX ORDER — METOPROLOL TARTRATE 50 MG
1 TABLET ORAL
Qty: 0 | Refills: 2 | COMMUNITY

## 2018-03-19 RX ORDER — ATORVASTATIN CALCIUM 80 MG/1
40 TABLET, FILM COATED ORAL AT BEDTIME
Qty: 0 | Refills: 0 | Status: DISCONTINUED | OUTPATIENT
Start: 2018-03-19 | End: 2018-03-20

## 2018-03-19 RX ORDER — TIOTROPIUM BROMIDE 18 UG/1
1 CAPSULE ORAL; RESPIRATORY (INHALATION)
Qty: 0 | Refills: 0 | COMMUNITY

## 2018-03-19 RX ORDER — METOPROLOL TARTRATE 50 MG
50 TABLET ORAL DAILY
Qty: 0 | Refills: 0 | Status: DISCONTINUED | OUTPATIENT
Start: 2018-03-19 | End: 2018-03-20

## 2018-03-19 RX ORDER — CLOPIDOGREL BISULFATE 75 MG/1
1 TABLET, FILM COATED ORAL
Qty: 0 | Refills: 0 | COMMUNITY

## 2018-03-19 RX ORDER — TICAGRELOR 90 MG/1
90 TABLET ORAL
Qty: 0 | Refills: 0 | Status: DISCONTINUED | OUTPATIENT
Start: 2018-03-19 | End: 2018-03-19

## 2018-03-19 RX ORDER — BUDESONIDE AND FORMOTEROL FUMARATE DIHYDRATE 160; 4.5 UG/1; UG/1
2 AEROSOL RESPIRATORY (INHALATION)
Qty: 0 | Refills: 0 | COMMUNITY

## 2018-03-19 RX ORDER — CLOPIDOGREL BISULFATE 75 MG/1
75 TABLET, FILM COATED ORAL DAILY
Qty: 0 | Refills: 0 | Status: DISCONTINUED | OUTPATIENT
Start: 2018-03-19 | End: 2018-03-20

## 2018-03-19 RX ORDER — LORATADINE 10 MG/1
1 TABLET ORAL
Qty: 0 | Refills: 0 | COMMUNITY

## 2018-03-19 RX ORDER — CLOPIDOGREL BISULFATE 75 MG/1
0 TABLET, FILM COATED ORAL
Qty: 0 | Refills: 0 | COMMUNITY

## 2018-03-19 RX ADMIN — Medication 243 MILLIGRAM(S): at 17:15

## 2018-03-19 NOTE — H&P ADULT - NSHPSOCIALHISTORY_GEN_ALL_CORE
Ex 1ppd for over 40 years, quit 2 years ago, social ETOH, Marijuana in the 60's.   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

## 2018-03-19 NOTE — H&P ADULT - HISTORY OF PRESENT ILLNESS
67 y.o Female former smoker with severe shellfish allergy, PMHx of COPD (denies home O2), Anemia, Lung Nodules under surveillance Q 6months, known CAD s/p FARHANA dRCA in January, started Brilanta bid, Metoprolol and Lipitor. She felt better for a few days and started re-experiencing worsening SOB, CARCAMO on minimal exertion, unable to ambulate 1/2 city block without stopping to catch her breath, + palpitations and mid sternal chest pain, relieved with rest. Her cardiologist gave her a loading dose of Plavix 600mg in the office this afternoon, recommended that she stops Brilanta, decreased Metoprolol from 100mg to 50mg daily and sent her to the ER for further evaluation.  In the ER, DDimer 234, her 1st troponin is negative, EKG non ischemic. CT PE protocol negative for PE but showed multiple nodules. She received 3 aspirins and admitted for CTA coronaries and possible cardiac catheterization if CTA is abnormal

## 2018-03-19 NOTE — H&P ADULT - ASSESSMENT
66 y/o F with HTN, HPL, COPD, known CAD with FARHANA RCA in January, presents with worsening SOB, CARCAMO

## 2018-03-19 NOTE — H&P ADULT - NSHPLABSRESULTS_GEN_ALL_CORE
11.0   15.8  )-----------( 314      ( 19 Mar 2018 17:34 )             35.1       03-19    138  |  99  |  13  ----------------------------<  96  3.8   |  27  |  0.63    Ca    9.7      19 Mar 2018 17:33    TPro  7.5  /  Alb  3.7  /  TBili  0.4  /  DBili  x   /  AST  18  /  ALT  20  /  AlkPhos  79  03-19      PT/INR - ( 19 Mar 2018 17:34 )   PT: 11.9 sec;   INR: 1.07          PTT - ( 19 Mar 2018 17:34 )  PTT:28.7 sec    CARDIAC MARKERS ( 19 Mar 2018 17:33 )  x     / <0.01 ng/mL / 140 U/L / x     / 5.0 ng/mL        CT angio chest PE protocol  INTERPRETATION:  Resident preliminary report    CTA (CT angiography) of the CHEST dated 3/19/2018 8:55 PM    INDICATION: 67-year-old female with shortness of breath. Assess for pulmonary embolism.    TECHNIQUE: CT angiography of the chest was performed during bolus injection of intravenous contrast.  Post-processing including the production of axial, coronal and sagittal multiplanar reformatted images and axial and coronal maximum intensity projections (MIPs) was performed.    PRIOR STUDY: None.    FINDINGS: No pulmonary embolism is seen. The thoracic aorta is normal in appearance. The heart is normal in size. No pericardial effusion is seen.   No mediastinal, hilar or axillary lymphadenopathy is seen.    No pleural effusions are seen. There are multiple bilateral solid pulmonary nodules measuring up to 0.6 cm. In the left upper lobe along the fissure there is a spiculated nodule measuring 1.2 cm suspicious for lung carcinoma  (image 87, series 6). There is bilateral apical scarring. There is moderate to severe centrilobular and paraseptal emphysema. There is scarring/subsegmental atelectasis in the right middle lobe and lingula.      Limited evaluation of the upper abdomen demonstrates no abnormality.     Evaluation of the osseous structures demonstrates degenerative changes.      IMPRESSION:   Spiculated nodule measuring 1.2 cm in the left upper lobe could represent lung carcinoma. Further evaluation with PET/CT and/or biopsy is recommended.  Multiple bilateral solid pulmonary nodules measuring up to 0.6 cm could be of infectious or inflammatory etiology, however attention for a distant malignancy should be paid on the above recommended PET/CT as these could represent metastasis.

## 2018-03-19 NOTE — ED PROVIDER NOTE - ATTENDING CONTRIBUTION TO CARE
I have seen and examined the pt, and I agree with the documentation/care/plan executed by CASEY Yeboah. Will relate CTA findings to admitting team for further workup in addition to cardiac study tomorrow.

## 2018-03-19 NOTE — ED ADULT NURSE NOTE - OBJECTIVE STATEMENT
66 yo F with pmh of HLD, COPD, anemia, hep C, CAD s/p 1 stent dRCA 1/2018 c/o OSHEA x 1 month. Pt states she was having OSHEA for months and was admitted in Jan where she underwent a cardiac cath with 1 stent. Pt states her OSHEA never improved but has been getting worse. Pt reports Oshea after only a few steps. Associated with palpitations and sweats. Pt reports some epigastric pain and nausea after taking her pills. Denies fever, chills, cp, dizziness, vomiting, abd pain. Denies smoking. Denies LE swelling. Denies recent travel. Pt saw her cardiologist today Dr. Dominguez and was referred to the ED to see Dr. Chan to have another cath to evaluate the stent. EKG done, pt placed on continuous cardiac monitor, VSS, NAD.

## 2018-03-19 NOTE — H&P ADULT - NSHPREVIEWOFSYSTEMS_GEN_ALL_CORE
GENERAL, CONSTITUTIONAL : denies recent weight loss, fever, chills  EYES, VISION: denies changes in vision   EARS, NOSE, THROAT: denies hearing loss  HEART, CARDIOVASCULAR: +chest pain, SOB, +palpitations; denies arrhythmia, LE edema, claudication  RESPIRATORY: + SOB; Denies cough, wheezing, PND, orthopnea  GASTROINTESTINAL: Denies abdominal pain, heartburn, bloody stool, dark tarry stool  GENITOURINARY: Denies frequent urination, urgency  MUSCULOSKELETAL denies joint pain or swelling, restricted motion, musculoskeletal pain.   SKIN & INTEGUMENTARY Denies rashes, sores, blisters, blisters, growths.  NEUROLOGICAL: Denies numbness or tingling sensations, sensation loss, burning.   PSYCHIATRIC: Denies nervousness, anxiety, depression  ENDOCRINE Denies heat or cold intolerance, excessive thirst  HEMATOLOGIC/LYMPHATIC: Denies abnormal bleeding, bleeding of any kind

## 2018-03-19 NOTE — ED PROVIDER NOTE - OBJECTIVE STATEMENT
68 yo F with pmh of HLD, COPD, anemia, hep C, CAD s/p 1 stent dRCA 1/2018 c/o OSHEA x 1 month. Pt states she was having OSHEA for months and was admitted in Jan where she underwent a cardiac cath with 1 stent. Pt states her OSHEA never improved but has been getting worse. Pt reports Oshea after only a few steps. Associated with palpitations and sweats. Pt reports some epigastric pain and nausea after taking her pills. Denies fever, chills, cp, dizziness, vomiting, abd pain. Denies smoking. Denies LE swelling. Denies recent travel. Pt saw her cardiologist today Dr. Dominguez and was referred to the ED to see Dr. Chan to have another cath to evaluate the stent.

## 2018-03-19 NOTE — H&P ADULT - PROBLEM SELECTOR PLAN 2
Continue Symbicort and Spiriva  Known Lung nodules, being followed by pulmonologist as outpt  Will send a copy of CT chest for comparison

## 2018-03-19 NOTE — ED PROVIDER NOTE - MEDICAL DECISION MAKING DETAILS
66 yo F with pmh of HLD, COPD, anemia, hep C, CAD s/p 1 stent dRCA 1/2018 c/o CARCAMO x 1 month. CARCAMO gradually worsening since stent placement. No CP. VSS. EKG NSR with no ischemic changes. r/o stent re-occlusion 66 yo F with pmh of HLD, COPD, anemia, hep C, CAD s/p 1 stent dRCA 1/2018 c/o CARCAMO x 1 month. CARCAMO gradually worsening since stent placement. No CP. VSS. EKG NSR with no ischemic changes. r/o stent re-occlusion. Pt with no PE risk factors will get ddimer as pt is low risk

## 2018-03-19 NOTE — H&P ADULT - PROBLEM SELECTOR PROBLEM 1
Coronary artery disease with stable angina pectoris, unspecified vessel or lesion type, unspecified whether native or transplanted heart

## 2018-03-19 NOTE — ED PROVIDER NOTE - CARE PLAN
Principal Discharge DX:	Chest pain at rest  Secondary Diagnosis:	Coronary artery disease with stable angina pectoris, unspecified vessel or lesion type, unspecified whether native or transplanted heart  Secondary Diagnosis:	Lung nodules

## 2018-03-19 NOTE — ED PROVIDER NOTE - SECONDARY DIAGNOSIS.
Coronary artery disease with stable angina pectoris, unspecified vessel or lesion type, unspecified whether native or transplanted heart Lung nodules

## 2018-03-19 NOTE — H&P ADULT - NSHPPHYSICALEXAM_GEN_ALL_CORE
Temp 98.6F, HR 70bpm, /80, RR 16, O2 sat 98% RA, Weight 200lbs, Height 5' 6"    T(C): 36.8 (03-19-18 @ 18:49), Max: 36.8 (03-19-18 @ 18:49)  HR: 89 (03-19-18 @ 23:32) (74 - 89)  BP: 128/80 (03-19-18 @ 23:32) (110/69 - 142/93)  RR: 16 (03-19-18 @ 23:32) (16 - 16)  SpO2: 99% (03-19-18 @ 23:32) (99% - 99%)  Wt(kg): --    Appearance: Normal	  HEENT:   Normal oral mucosa, PERRL, EOMI	  Neck: Supple,  - JVD; No Carotid Bruit and 2+ pulses B/L  Cardiovascular: Normal S1 S2, No JVD, No murmurs  Respiratory: Lungs clear to auscultation, No Rales, Rhonchi, Wheezing	  Gastrointestinal:  Soft, Non-tender, + BS	  Skin: No rashes, No ecchymoses, No cyanosis  Extremities: Normal range of motion, No clubbing, cyanosis or edema  Vascular: Femoral pulses 2+ b/l without bruit, DP 1+ b/l, PT 1+ b/l  Neurologic: Non-focal  Psychiatry: A & O x 3, Mood & affect appropriate

## 2018-03-19 NOTE — ED ADULT TRIAGE NOTE - CHIEF COMPLAINT QUOTE
pt sent in by cardiologist for cardiac catheterization . pt c/o SOB on exertion x 1 month. pt had stent placed in January.

## 2018-03-20 VITALS
TEMPERATURE: 98 F | RESPIRATION RATE: 18 BRPM | SYSTOLIC BLOOD PRESSURE: 110 MMHG | HEART RATE: 73 BPM | OXYGEN SATURATION: 96 % | DIASTOLIC BLOOD PRESSURE: 74 MMHG

## 2018-03-20 DIAGNOSIS — J44.9 CHRONIC OBSTRUCTIVE PULMONARY DISEASE, UNSPECIFIED: ICD-10-CM

## 2018-03-20 DIAGNOSIS — I25.118 ATHEROSCLEROTIC HEART DISEASE OF NATIVE CORONARY ARTERY WITH OTHER FORMS OF ANGINA PECTORIS: ICD-10-CM

## 2018-03-20 PROBLEM — D64.9 ANEMIA, UNSPECIFIED: Chronic | Status: ACTIVE | Noted: 2018-01-25

## 2018-03-20 PROBLEM — R91.8 OTHER NONSPECIFIC ABNORMAL FINDING OF LUNG FIELD: Chronic | Status: ACTIVE | Noted: 2018-01-25

## 2018-03-20 PROBLEM — Z87.09 PERSONAL HISTORY OF OTHER DISEASES OF THE RESPIRATORY SYSTEM: Chronic | Status: ACTIVE | Noted: 2018-01-26

## 2018-03-20 LAB
ANION GAP SERPL CALC-SCNC: 14 MMOL/L — SIGNIFICANT CHANGE UP (ref 5–17)
APPEARANCE UR: (no result)
BACTERIA # UR AUTO: PRESENT /HPF
BILIRUB UR-MCNC: NEGATIVE — SIGNIFICANT CHANGE UP
BUN SERPL-MCNC: 14 MG/DL — SIGNIFICANT CHANGE UP (ref 7–23)
CALCIUM SERPL-MCNC: 9.1 MG/DL — SIGNIFICANT CHANGE UP (ref 8.4–10.5)
CHLORIDE SERPL-SCNC: 100 MMOL/L — SIGNIFICANT CHANGE UP (ref 96–108)
CHOLEST SERPL-MCNC: 105 MG/DL — SIGNIFICANT CHANGE UP (ref 10–199)
CK MB CFR SERPL CALC: 5.1 NG/ML — SIGNIFICANT CHANGE UP (ref 0–6.7)
CK SERPL-CCNC: 133 U/L — SIGNIFICANT CHANGE UP (ref 25–170)
CO2 SERPL-SCNC: 25 MMOL/L — SIGNIFICANT CHANGE UP (ref 22–31)
COLOR SPEC: YELLOW — SIGNIFICANT CHANGE UP
CREAT SERPL-MCNC: 0.62 MG/DL — SIGNIFICANT CHANGE UP (ref 0.5–1.3)
DIFF PNL FLD: (no result)
EPI CELLS # UR: (no result) /HPF (ref 0–5)
GLUCOSE SERPL-MCNC: 108 MG/DL — HIGH (ref 70–99)
GLUCOSE UR QL: NEGATIVE — SIGNIFICANT CHANGE UP
HCT VFR BLD CALC: 33.4 % — LOW (ref 34.5–45)
HDLC SERPL-MCNC: 43 MG/DL — SIGNIFICANT CHANGE UP (ref 40–125)
HGB BLD-MCNC: 10.5 G/DL — LOW (ref 11.5–15.5)
KETONES UR-MCNC: NEGATIVE — SIGNIFICANT CHANGE UP
LEUKOCYTE ESTERASE UR-ACNC: (no result)
LIPID PNL WITH DIRECT LDL SERPL: 46 MG/DL — SIGNIFICANT CHANGE UP
MAGNESIUM SERPL-MCNC: 2 MG/DL — SIGNIFICANT CHANGE UP (ref 1.6–2.6)
MCHC RBC-ENTMCNC: 28.8 PG — SIGNIFICANT CHANGE UP (ref 27–34)
MCHC RBC-ENTMCNC: 31.4 G/DL — LOW (ref 32–36)
MCV RBC AUTO: 91.5 FL — SIGNIFICANT CHANGE UP (ref 80–100)
NITRITE UR-MCNC: NEGATIVE — SIGNIFICANT CHANGE UP
PH UR: 6.5 — SIGNIFICANT CHANGE UP (ref 5–8)
PLATELET # BLD AUTO: 305 K/UL — SIGNIFICANT CHANGE UP (ref 150–400)
POTASSIUM SERPL-MCNC: 3.8 MMOL/L — SIGNIFICANT CHANGE UP (ref 3.5–5.3)
POTASSIUM SERPL-SCNC: 3.8 MMOL/L — SIGNIFICANT CHANGE UP (ref 3.5–5.3)
PROT UR-MCNC: NEGATIVE MG/DL — SIGNIFICANT CHANGE UP
RBC # BLD: 3.65 M/UL — LOW (ref 3.8–5.2)
RBC # FLD: 14.7 % — SIGNIFICANT CHANGE UP (ref 10.3–16.9)
RBC CASTS # UR COMP ASSIST: (no result) /HPF
SODIUM SERPL-SCNC: 139 MMOL/L — SIGNIFICANT CHANGE UP (ref 135–145)
SP GR SPEC: <=1.005 — SIGNIFICANT CHANGE UP (ref 1–1.03)
TOTAL CHOLESTEROL/HDL RATIO MEASUREMENT: 2.4 RATIO — LOW (ref 3.3–7.1)
TRIGL SERPL-MCNC: 80 MG/DL — SIGNIFICANT CHANGE UP (ref 10–149)
TROPONIN T SERPL-MCNC: <0.01 NG/ML — SIGNIFICANT CHANGE UP (ref 0–0.01)
TROPONIN T SERPL-MCNC: <0.01 NG/ML — SIGNIFICANT CHANGE UP (ref 0–0.01)
UROBILINOGEN FLD QL: 0.2 E.U./DL — SIGNIFICANT CHANGE UP
WBC # BLD: 13 K/UL — HIGH (ref 3.8–10.5)
WBC # FLD AUTO: 13 K/UL — HIGH (ref 3.8–10.5)
WBC UR QL: (no result) /HPF

## 2018-03-20 PROCEDURE — 36415 COLL VENOUS BLD VENIPUNCTURE: CPT

## 2018-03-20 PROCEDURE — 99238 HOSP IP/OBS DSCHRG MGMT 30/<: CPT

## 2018-03-20 PROCEDURE — 80061 LIPID PANEL: CPT

## 2018-03-20 PROCEDURE — 81001 URINALYSIS AUTO W/SCOPE: CPT

## 2018-03-20 PROCEDURE — 85730 THROMBOPLASTIN TIME PARTIAL: CPT

## 2018-03-20 PROCEDURE — 82550 ASSAY OF CK (CPK): CPT

## 2018-03-20 PROCEDURE — 71275 CT ANGIOGRAPHY CHEST: CPT

## 2018-03-20 PROCEDURE — 82553 CREATINE MB FRACTION: CPT

## 2018-03-20 PROCEDURE — 83735 ASSAY OF MAGNESIUM: CPT

## 2018-03-20 PROCEDURE — 93005 ELECTROCARDIOGRAM TRACING: CPT

## 2018-03-20 PROCEDURE — 87086 URINE CULTURE/COLONY COUNT: CPT

## 2018-03-20 PROCEDURE — 84484 ASSAY OF TROPONIN QUANT: CPT

## 2018-03-20 PROCEDURE — 85027 COMPLETE CBC AUTOMATED: CPT

## 2018-03-20 PROCEDURE — 80053 COMPREHEN METABOLIC PANEL: CPT

## 2018-03-20 PROCEDURE — 85025 COMPLETE CBC W/AUTO DIFF WBC: CPT

## 2018-03-20 PROCEDURE — 85610 PROTHROMBIN TIME: CPT

## 2018-03-20 PROCEDURE — 99285 EMERGENCY DEPT VISIT HI MDM: CPT | Mod: 25

## 2018-03-20 PROCEDURE — 94640 AIRWAY INHALATION TREATMENT: CPT

## 2018-03-20 PROCEDURE — 85379 FIBRIN DEGRADATION QUANT: CPT

## 2018-03-20 PROCEDURE — 75574 CT ANGIO HRT W/3D IMAGE: CPT | Mod: 26

## 2018-03-20 PROCEDURE — 80048 BASIC METABOLIC PNL TOTAL CA: CPT

## 2018-03-20 PROCEDURE — 75574 CT ANGIO HRT W/3D IMAGE: CPT

## 2018-03-20 PROCEDURE — 71046 X-RAY EXAM CHEST 2 VIEWS: CPT

## 2018-03-20 RX ORDER — METOPROLOL TARTRATE 50 MG
1 TABLET ORAL
Qty: 30 | Refills: 0 | OUTPATIENT
Start: 2018-03-20 | End: 2018-04-18

## 2018-03-20 RX ORDER — METOPROLOL TARTRATE 50 MG
25 TABLET ORAL ONCE
Qty: 0 | Refills: 0 | Status: COMPLETED | OUTPATIENT
Start: 2018-03-20 | End: 2018-03-20

## 2018-03-20 RX ORDER — POTASSIUM CHLORIDE 20 MEQ
20 PACKET (EA) ORAL ONCE
Qty: 0 | Refills: 0 | Status: COMPLETED | OUTPATIENT
Start: 2018-03-20 | End: 2018-03-20

## 2018-03-20 RX ORDER — SODIUM CHLORIDE 9 MG/ML
500 INJECTION INTRAMUSCULAR; INTRAVENOUS; SUBCUTANEOUS
Qty: 0 | Refills: 0 | Status: DISCONTINUED | OUTPATIENT
Start: 2018-03-20 | End: 2018-03-20

## 2018-03-20 RX ADMIN — BUDESONIDE AND FORMOTEROL FUMARATE DIHYDRATE 2 PUFF(S): 160; 4.5 AEROSOL RESPIRATORY (INHALATION) at 08:32

## 2018-03-20 RX ADMIN — Medication 50 MILLIGRAM(S): at 00:50

## 2018-03-20 RX ADMIN — Medication 25 MILLIGRAM(S): at 10:49

## 2018-03-20 RX ADMIN — Medication 81 MILLIGRAM(S): at 09:48

## 2018-03-20 RX ADMIN — CLOPIDOGREL BISULFATE 75 MILLIGRAM(S): 75 TABLET, FILM COATED ORAL at 09:48

## 2018-03-20 RX ADMIN — Medication 20 MILLIEQUIVALENT(S): at 09:48

## 2018-03-20 RX ADMIN — SODIUM CHLORIDE 75 MILLILITER(S): 9 INJECTION INTRAMUSCULAR; INTRAVENOUS; SUBCUTANEOUS at 07:54

## 2018-03-20 RX ADMIN — TIOTROPIUM BROMIDE 18 MICROGRAM(S): 18 CAPSULE ORAL; RESPIRATORY (INHALATION) at 09:15

## 2018-03-20 NOTE — DISCHARGE NOTE ADULT - HOSPITAL COURSE
66 y/o F, Ex-Smoker w/ h/o Shellfish Allergy, PMHX COPD, Chronic Anemia, Lung Nodules who presented with CARCAMO.     CAD  CE negative x 3. Currently Asymptomatic  S/p cardiac cath. in 1/2018: FARHANA dRCA, dLAD 50-60%, pLCX 50-60%  Continue ASA/Plavix/Toprol XL/Lipitor   CTA coronaries today showed patent distal right coronary artery stent. Borderline moderate stenosis in the proximal left circumflex artery. Non-obstructive stenosis in the left anterior descending (LAD),   Diagonal 1 branch of the LAD and proximal and mid right coronary artery. Left main coronary artery is normal    COPD  Stable. Continue home regimen    Lung Nodules  CTA Chest showed no PE, a 1.3 cm spiculated nodule in the left upper lobe, which is suspicious. Further evaluation with PET/CT and/or biopsy is recommended. Multiple additional bilateral solid pulmonary nodules measuring up to   0.6 cm could be of infectious or inflammatory in etiology, however, attention for a distant malignancy should be paid on the above recommended PET/CT, as these could represent metastasis. A 3.8 x 3.6 cm lobulated fluid dense cystic lesion is noted in the R breast, which is indeterminant. Correlation with targeted right breast ultrasound is recommended.     Leukocytosis  WBC 13 in 1/2018. Was 15 yesterday and 13 today. Pt afebrile. Denies dysuria, diarrhea, and cough. No infiltrate on CT chest. Urinalysis showed large leukocyte esterase, no nitrates. UCx sent, to be followed up as outpatient.    Pt stable for D/C as per Dr. Abraham. Pulmonologist aware of CT scan of chest at [facility]. See Pulmonologist in 1-2 days. See cardiologist this week. See Primary Doctor this week     Pt seen and examined bedside.  Patient denies C/P, SOB, N/V, dizziness, palpitations, and diaphoresis.  Pt denies fever/chills, dysuria, abdominal pain, diarrhea, and cough  	  V/S 	BP: 120s/70s 	HR: 70s	RR: 16	  T: 98	  O2:  97% RA  	  GEN: NAD  PULM:  CTA B/L  CARD: No JVD B/L, RRR, S1 and S2   ABD: +BS, NT, soft/ND	  EXT: No Edema B/L LE  NEURO: A+Ox3, no focal deficit                          10.5   13.0  )-----------( 305      ( 20 Mar 2018 05:25 )             33.4     03-20    139  |  100  |  14  ----------------------------<  108<H>  3.8   |  25  |  0.62    Ca    9.1      20 Mar 2018 05:25  Mg     2.0     03-20    TPro  7.5  /  Alb  3.7  /  TBili  0.4  /  DBili  x   /  AST  18  /  ALT  20  /  AlkPhos  79  03-19

## 2018-03-20 NOTE — DISCHARGE NOTE ADULT - PATIENT PORTAL LINK FT
You can access the [facility] Patient Portal, offered by [facility], by registering with the following website: [email or web address]

## 2018-03-20 NOTE — DISCHARGE NOTE ADULT - MEDICATION SUMMARY - MEDICATIONS TO TAKE
I will START or STAY ON the medications listed below when I get home from the hospital:    Ecotrin Adult Low Strength 81 mg oral delayed release tablet  -- 1 tab(s) by mouth once a day  -- Indication: For Coronary Artery Disease and Stent.    Claritin 24 Hour Allergy 10 mg oral tablet  -- 1 tab(s) by mouth every 12 hours, As Needed  -- Indication: For Seasonal Allergies    atorvastatin 40 mg oral tablet  -- 1 tab(s) by mouth once a day (at bedtime)  -- Indication: For Cholesterol / Coronary Artery Disease    Plavix 75 mg oral tablet  -- 1 tab(s) by mouth once a day  -- Indication: For Coronary Artery Disease and Stent.    Toprol-XL 50 mg oral tablet, extended release  -- 1 tab(s) by mouth once a day  -- Indication: For Coronary artery disease / Hypertension    Symbicort 160 mcg-4.5 mcg/inh inhalation aerosol  -- 2 puff(s) inhaled 2 times a day  -- Indication: For COPD (chronic obstructive pulmonary disease)    Spiriva 18 mcg inhalation capsule  -- 1 cap(s) inhaled once a day  -- Indication: For COPD (chronic obstructive pulmonary disease) I will START or STAY ON the medications listed below when I get home from the hospital:    Ecotrin Adult Low Strength 81 mg oral delayed release tablet  -- 1 tab(s) by mouth once a day  -- Indication: For Coronary Artery Disease and Stent.    Claritin 24 Hour Allergy 10 mg oral tablet  -- 1 tab(s) by mouth every 12 hours, As Needed  -- Indication: For Seasonal Allergies    atorvastatin 40 mg oral tablet  -- 1 tab(s) by mouth once a day (at bedtime)  -- Indication: For Cholesterol / Coronary Artery Disease    Plavix 75 mg oral tablet  -- 1 tab(s) by mouth once a day  -- Indication: For Coronary Artery Disease and Stent.    Toprol-XL 50 mg oral tablet, extended release  -- 1 tab(s) by mouth once a day  -- Indication: For Coronary artery disease / Hypertension    Toprol-XL 50 mg oral tablet, extended release  -- 1 tab(s) by mouth once a day   -- It is very important that you take or use this exactly as directed.  Do not skip doses or discontinue unless directed by your doctor.  May cause drowsiness.  Alcohol may intensify this effect.  Use care when operating dangerous machinery.  Some non-prescription drugs may aggravate your condition.  Read all labels carefully.  If a warning appears, check with your doctor before taking.  Swallow whole.  Do not crush.  Take with food or milk.  This drug may impair the ability to drive or operate machinery.  Use care until you become familiar with its effects.    -- Indication: For Hypertension / Coronary Artery Disease    Symbicort 160 mcg-4.5 mcg/inh inhalation aerosol  -- 2 puff(s) inhaled 2 times a day  -- Indication: For COPD (chronic obstructive pulmonary disease)    Spiriva 18 mcg inhalation capsule  -- 1 cap(s) inhaled once a day  -- Indication: For COPD (chronic obstructive pulmonary disease)

## 2018-03-20 NOTE — DISCHARGE NOTE ADULT - PLAN OF CARE
MD follow-up, Medication compliance Continue listed medical regimen. See cardiologist this week. See Primary Doctor this week Continue listed medical regimen. MD follow-up Your Pulmonologist aware of CT scan of chest at [facility]. See your Pulmonologist in 1-2 days. Your doctor will decide if you should have a CT guided lung biopsy on follow-up. Breast US on follow-up with your Primary Doctor based on CT scan findings showing a possible breast cyst/lesion If fever or chills, pain urinating, abdominal pain, diarrhea, or cough noted call MD immediately and seek medical attention. Urine culture sent at [facility], can be followed up by your physician or Dr. Dominguez calling [facility]. Our floor office is [phone]

## 2018-03-20 NOTE — DISCHARGE NOTE ADULT - PROVIDER TOKENS
FREE:[LAST:[Angelica],FIRST:[César],PHONE:[phone]],FAX:[phone]   [phone]    -],ADDRESS:[[address] [place] [address]]]

## 2018-03-20 NOTE — DISCHARGE NOTE ADULT - CARE PLAN
Principal Discharge DX:	CAD (coronary artery disease)  Goal:	MD follow-up, Medication compliance  Assessment and plan of treatment:	Continue listed medical regimen. See cardiologist this week. See Primary Doctor this week  Secondary Diagnosis:	COPD (chronic obstructive pulmonary disease)  Assessment and plan of treatment:	Continue listed medical regimen. MD follow-up  Secondary Diagnosis:	Lung nodules  Assessment and plan of treatment:	Your Pulmonologist aware of CT scan of chest at [facility]. See your Pulmonologist in 1-2 days. Your doctor will decide if you should have a CT guided lung biopsy on follow-up. Breast US on follow-up with your Primary Doctor based on CT scan findings showing a possible breast cyst/lesion  Secondary Diagnosis:	Leukocytosis  Assessment and plan of treatment:	If fever or chills, pain urinating, abdominal pain, diarrhea, or cough noted call MD immediately and seek medical attention. Urine culture sent at [facility], can be followed up by your physician or Dr. Dominguez calling [facility]. Our floor office is [phone]

## 2018-03-20 NOTE — DISCHARGE NOTE ADULT - CARE PROVIDER_API CALL
César Dominguez  [address] [place] [id number]  Phone: [phone]  Fax: (   )    -

## 2018-03-21 LAB
CULTURE RESULTS: NO GROWTH — SIGNIFICANT CHANGE UP
SPECIMEN SOURCE: SIGNIFICANT CHANGE UP

## 2018-03-25 DIAGNOSIS — I25.119 ATHEROSCLEROTIC HEART DISEASE OF NATIVE CORONARY ARTERY WITH UNSPECIFIED ANGINA PECTORIS: ICD-10-CM

## 2018-03-25 DIAGNOSIS — I25.118 ATHEROSCLEROTIC HEART DISEASE OF NATIVE CORONARY ARTERY WITH OTHER FORMS OF ANGINA PECTORIS: ICD-10-CM

## 2018-03-25 DIAGNOSIS — Z91.013 ALLERGY TO SEAFOOD: ICD-10-CM

## 2018-03-25 DIAGNOSIS — Z95.5 PRESENCE OF CORONARY ANGIOPLASTY IMPLANT AND GRAFT: ICD-10-CM

## 2018-03-25 DIAGNOSIS — R91.8 OTHER NONSPECIFIC ABNORMAL FINDING OF LUNG FIELD: ICD-10-CM

## 2018-03-25 DIAGNOSIS — E78.5 HYPERLIPIDEMIA, UNSPECIFIED: ICD-10-CM

## 2018-03-25 DIAGNOSIS — I10 ESSENTIAL (PRIMARY) HYPERTENSION: ICD-10-CM

## 2018-03-25 DIAGNOSIS — D64.9 ANEMIA, UNSPECIFIED: ICD-10-CM

## 2018-03-25 DIAGNOSIS — D72.829 ELEVATED WHITE BLOOD CELL COUNT, UNSPECIFIED: ICD-10-CM

## 2018-03-25 DIAGNOSIS — J44.9 CHRONIC OBSTRUCTIVE PULMONARY DISEASE, UNSPECIFIED: ICD-10-CM

## 2018-03-25 DIAGNOSIS — Z87.891 PERSONAL HISTORY OF NICOTINE DEPENDENCE: ICD-10-CM

## 2024-08-01 NOTE — PATIENT PROFILE ADULT. - TEACHING/LEARNING RELIGIOUS CONSIDERATIONS OTHER
DISCHARGE INSTRUCTIONS FOR CATARACT SURGERY    DIET                          Slowly advance to your usual diet. Remain on clear liquids if you are nauseated. If nausea persists, contact your doctor.     ANESTHESIA           You need to have an adult (18 years or older) stay with you overnight.  Anesthetics may make you feel dizzy, tired and unsteady.            ACTIVITY                  No strenuous activity, rest today.  Do not lift any heavy objects.  Do not drive today. You may drive tomorrow if you are feeling comfortable.                                       MEDICATIONS         Resume your usual medication unless otherwise instructed by your doctor.  If you take glaucoma eye drops continue them as prescribed.              You may take regular strength Tylenol/acetaminophen or Motrin/ibuprofen for any discomfort in the eye.           WOUND CARE         Wash your hands with soap and water before and after touching your dressing or surgical site.  Wear sunglasses outdoors.                                                           Wear your eye shield tonight when you go to bed.  You may dab your eye gently for any tearing.  DO NOT RUB YOUR EYE  You may experience blurry vision that may increase throughout the day, this is normal  You may experience scratchiness, soreness or headaches (this is normal).  An injection of medication has been placed in the operative eye, this may cause redness, swelling or bruising on the white of the eye.                                                                    Call Dr. Latham with any questions, concerns or any of the following:  -Sudden onset of pain, or pain that worsens  Office number [phone] and After Hours [phone]                                           Patient and accompanying adult have been instructed on the above. Patient and wife verbalize the understanding of the above instructions. All belongings have been returned.     
none

## 2024-08-27 NOTE — H&P ADULT - NSHPPOAPULMEMBOLUS_GEN_A_CORE
[facility] [place]  Nurses Note -- 4 Eyes      8/27/2024       Skin assessed on: Q Shift      [] No Pressure Injuries Present    []Prevention Measures Documented    [x] Yes LDA  for Pressure Injury Previously documented     [] Yes New Pressure Injury Discovered   [] LDA for New Pressure Injury Added      Attending RN:  Love Poole LPN     Second RN:  DARRION Carr       no